# Patient Record
Sex: FEMALE | Race: WHITE | NOT HISPANIC OR LATINO | Employment: FULL TIME | URBAN - METROPOLITAN AREA
[De-identification: names, ages, dates, MRNs, and addresses within clinical notes are randomized per-mention and may not be internally consistent; named-entity substitution may affect disease eponyms.]

---

## 2017-05-23 ENCOUNTER — OFFICE VISIT - HEALTHEAST (OUTPATIENT)
Dept: INTERNAL MEDICINE | Facility: CLINIC | Age: 37
End: 2017-05-23

## 2017-05-23 DIAGNOSIS — I10 HYPERTENSION: ICD-10-CM

## 2017-05-23 DIAGNOSIS — J02.9 SORE THROAT: ICD-10-CM

## 2017-05-23 DIAGNOSIS — J32.9 SINUSITIS: ICD-10-CM

## 2017-06-15 ENCOUNTER — COMMUNICATION - HEALTHEAST (OUTPATIENT)
Dept: INTERNAL MEDICINE | Facility: CLINIC | Age: 37
End: 2017-06-15

## 2017-06-15 DIAGNOSIS — I10 HYPERTENSION: ICD-10-CM

## 2017-06-20 RX ORDER — LOSARTAN POTASSIUM 100 MG/1
100 TABLET ORAL DAILY
Qty: 90 TABLET | Refills: 3 | Status: SHIPPED | OUTPATIENT
Start: 2017-06-20

## 2020-02-21 ENCOUNTER — OFFICE VISIT - HEALTHEAST (OUTPATIENT)
Dept: FAMILY MEDICINE | Facility: CLINIC | Age: 40
End: 2020-02-21

## 2020-02-21 DIAGNOSIS — H60.502 ACUTE OTITIS EXTERNA OF LEFT EAR, UNSPECIFIED TYPE: ICD-10-CM

## 2020-02-21 RX ORDER — CIPROFLOXACIN AND DEXAMETHASONE 3; 1 MG/ML; MG/ML
4 SUSPENSION/ DROPS AURICULAR (OTIC) 2 TIMES DAILY
Qty: 10 ML | Refills: 0 | Status: SHIPPED | OUTPATIENT
Start: 2020-02-21

## 2020-07-24 ENCOUNTER — AMBULATORY - HEALTHEAST (OUTPATIENT)
Dept: OBGYN | Facility: CLINIC | Age: 40
End: 2020-07-24

## 2020-07-24 DIAGNOSIS — J02.9 SORE THROAT: ICD-10-CM

## 2021-06-04 VITALS
OXYGEN SATURATION: 98 % | WEIGHT: 233.8 LBS | SYSTOLIC BLOOD PRESSURE: 148 MMHG | BODY MASS INDEX: 38.91 KG/M2 | HEART RATE: 63 BPM | DIASTOLIC BLOOD PRESSURE: 100 MMHG

## 2021-06-06 NOTE — PROGRESS NOTES
Assessment/Plan:         Stacy was seen today for ear pain.    Diagnoses and all orders for this visit:    Acute otitis externa of left ear, unspecified type: exam is consistent with otitis externa including erythematous ear, swollen canal. The blood vessel may be barotrauma from her recent cough - could also be an incompletely treated ear infection (because she has had 5 days of doxy which should cover otitis media). Either way, ciprodex is likely to help symptomatically, treat otitis externa. Close follow-up if not improving given her plan to dive in a couple of weeks.   -     ciprofloxacin-dexamethasone (CIPRODEX) otic suspension; Administer 4 drops into the left ear 2 (two) times a day.                Plan of care was discussed with the patient and/or guardian. They verbalize understanding of the treatment options and plan of care.    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   Located on the left side. Started this morning. Quite significant discomfort in the ear - distracting from work level discomfort (surgeon/physician)  Has completed 5 of 10 days of doxycycline for a sinus infection - that is getting better, ear pain is new.   She has no fever.  Was coughing with sinus symptoms, significant congestion as well.  She did start scuba diving in December, thinks she might have had swimmers ear after that trip in the right ear- used ofloxacin drops for a few days and it seemed to resolve.   She has another dive planned in a couple of weeks and wants to make sure she is healthy for that.     She does have hypertension, did not take her losartan this morning.   Otherwise she is healthy.           Objective:       Blood pressure (!) 148/100, pulse 63, weight (!) 233 lb 12.8 oz (106.1 kg), SpO2 98 %, not currently breastfeeding.   Gen: well appearing, very pleasant, no distress  Ears: her left ear is visibly erythematous externally,but painless  to touch or pull. The left ear canal is mildly swollen in comparison to the right - there is no visible discharge in the canal. The tympanic membrane has redness consistent with a ruptured blood vessel in the upper anterior aspect of the membrane.   The right ear canal, TM are normal.

## 2021-06-10 NOTE — PROGRESS NOTES
ASSESSMENT/PLAN:  1. Sinusitis  I think this is the cause of her persistent sore throat.  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  Afrin now to help decrease her pain and symptoms.  Saline nasal spray and saltwater gargle.    2. Sore throat  Need secondary to #1 above  - Rapid Strep A Screen-Throat  - Group A Strep, RNA Direct Detection, Throat    3. Hypertension  Well-controlled on current meds and will follow-up for physical exam sometime in the next 3 months.    Patient Instructions   Start taking doxycycline, 100 mg twice daily for 10 days.     Use Afrin nasal spray for the next couple of days as needed.     Continue with salt water nasal sprays and salt water gargle.       Return in about 3 months (around 8/23/2017) for Annual physical.    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 She was diagnosed with bronchitis and was instructed to use her albuterol inhaler as needed. She has also developed a very sore throat. She states that she was out of town on a trip to [place]; on the 6th day that she was in [place] she started developing a junky cough. A couple of days later when she was home she was noticing some wheezing when she was trying to lay down. She also noticed external swelling in her neck prior to her presentation to the ER. She had nebulizer treatments in the ER and had an extensive work up completed while there. She was treated with a 5-day course of Zithromax and Medrol. Around day 8 of her illness he was feeling much better. She had a productive cough the entire time and her throat pain has been very severe. She states that the throat pain is her worst symptom at this time.  "She had a negative rapid strep test lab today. She has also had diarrhea on the Zithromax. She states that her ear pain has also been a significant symptom during her infection. She has pain with chewing. She has been doing salt water gargles and nasal sprays. She has been taking phenylephrine consistently the last couple of days; she has taken a total of 3 doses today.     Of note, she had an ear infection at the age of 24 in her right ear. She has noticed that when she is on long flights she will notice     Hypertension: She has been taking her losartan in the mornings. Her blood pressure is 122/64 in clinic today.     REVIEW OF SYSTEMS:   Comprehensive review of systems negative except as noted above.    PFSH:  Social: She was in [place] for 9 days last week.   Reviewed as above.     TOBACCO USE:  History   Smoking Status     Never Smoker   Smokeless Tobacco     Not on file       VITALS:  Vitals:    05/23/17 1310   BP: 122/64   Pulse: 64   Temp: 98.4  F (36.9  C)   TempSrc: Oral     Wt Readings from Last 3 Encounters:   05/07/17 (!) 245 lb (111.1 kg)   06/29/15 (!) 230 lb (104.3 kg)   02/10/15 (!) 233 lb 1.6 oz (105.7 kg)     Estimated body mass index is 40.77 kg/(m^2) as calculated from the following:    Height as of 5/7/17: 5' 5\" (1.651 m).    Weight as of 5/7/17: 245 lb (111.1 kg).    PHYSICAL EXAM:  General Appearance: Alert, cooperative, no distress, appears stated age.  HEENT: EMOI, fundi not observed, TMs normal, mouth and throat without lesions. Throat had post nasal drip look cobble stoning. Maxillary sinuses mildly tender to percussion. Frontal sinuses non-tender.   Neck: Supple without adenopathy.  Lungs: Clear to auscultation bilaterally, good air movement.   Psychiatric: She has a normal mood and affect.     Notes Reviewed, additional history from source other than patient (2 TOTAL): Reviewed ER note from 5/7/17; SOB, bronchitis, Ventolin inhaler.     Accessed Care Everywhere, Requested Records, " Consult with Physician (1 TOTAL): None.     Radiology tests summarized or ordered (XR, CT, MRI, DXA, US) (1 TOTAL): None.    Labs reviewed or ordered (1 TOTAL): Reviewed labs from 5/7/17; CMP, HM2. Ordered rapid strep test lab today.     Medicine tests reviewed or ordered (ECG, echocardiogram, colonoscopy, EGD, venous US) (1 TOTAL): None.    Independent review of ECG or XR (2 EACH): None.      The visit lasted a total of 11 minutes face to face with the patient. Over 50% of the time was spent counseling and educating the patient about cough.    IShireen, am scribing for and in the presence of, Dr. Klein.    I, Dr. Klein, personally performed the services described in this documentation, as scribed by Shireen Echavarria in my presence, and it is both accurate and complete.    MEDICATIONS:  Current Outpatient Prescriptions   Medication Sig Dispense Refill     losartan (COZAAR) 100 MG tablet TAKE 1 TABLET BY MOUTH EVERY DAY 90 tablet 3     doxycycline (ADOXA) 100 MG tablet Take 1 tablet (100 mg total) by mouth 2 (two) times a day for 10 days. 20 tablet 0     No current facility-administered medications for this visit.        Total data points: 3

## 2021-08-21 ENCOUNTER — HEALTH MAINTENANCE LETTER (OUTPATIENT)
Age: 41
End: 2021-08-21

## 2021-10-14 ENCOUNTER — LAB (OUTPATIENT)
Dept: LAB | Facility: CLINIC | Age: 41
End: 2021-10-14
Attending: NURSE PRACTITIONER
Payer: COMMERCIAL

## 2021-10-14 DIAGNOSIS — Z20.822 EXPOSURE TO 2019 NOVEL CORONAVIRUS: ICD-10-CM

## 2021-10-14 DIAGNOSIS — Z20.822 EXPOSURE TO 2019 NOVEL CORONAVIRUS: Primary | ICD-10-CM

## 2021-10-14 LAB — SARS-COV-2 RNA RESP QL NAA+PROBE: NEGATIVE

## 2021-10-14 PROCEDURE — U0005 INFEC AGEN DETEC AMPLI PROBE: HCPCS

## 2021-10-14 PROCEDURE — U0003 INFECTIOUS AGENT DETECTION BY NUCLEIC ACID (DNA OR RNA); SEVERE ACUTE RESPIRATORY SYNDROME CORONAVIRUS 2 (SARS-COV-2) (CORONAVIRUS DISEASE [COVID-19]), AMPLIFIED PROBE TECHNIQUE, MAKING USE OF HIGH THROUGHPUT TECHNOLOGIES AS DESCRIBED BY CMS-2020-01-R: HCPCS

## 2021-10-16 ENCOUNTER — HEALTH MAINTENANCE LETTER (OUTPATIENT)
Age: 41
End: 2021-10-16

## 2022-10-01 ENCOUNTER — HEALTH MAINTENANCE LETTER (OUTPATIENT)
Age: 42
End: 2022-10-01

## 2023-10-15 ENCOUNTER — HEALTH MAINTENANCE LETTER (OUTPATIENT)
Age: 43
End: 2023-10-15

## 2024-03-03 ENCOUNTER — HEALTH MAINTENANCE LETTER (OUTPATIENT)
Age: 44
End: 2024-03-03